# Patient Record
Sex: MALE | Race: WHITE | URBAN - METROPOLITAN AREA
[De-identification: names, ages, dates, MRNs, and addresses within clinical notes are randomized per-mention and may not be internally consistent; named-entity substitution may affect disease eponyms.]

---

## 2017-09-03 ENCOUNTER — OFFICE VISIT (OUTPATIENT)
Dept: URGENT CARE | Facility: URGENT CARE | Age: 27
End: 2017-09-03
Payer: COMMERCIAL

## 2017-09-03 ENCOUNTER — RADIANT APPOINTMENT (OUTPATIENT)
Dept: GENERAL RADIOLOGY | Facility: CLINIC | Age: 27
End: 2017-09-03
Attending: PHYSICIAN ASSISTANT
Payer: COMMERCIAL

## 2017-09-03 VITALS
WEIGHT: 175 LBS | OXYGEN SATURATION: 98 % | HEIGHT: 72 IN | SYSTOLIC BLOOD PRESSURE: 128 MMHG | BODY MASS INDEX: 23.7 KG/M2 | HEART RATE: 96 BPM | TEMPERATURE: 98.8 F | DIASTOLIC BLOOD PRESSURE: 60 MMHG

## 2017-09-03 DIAGNOSIS — M25.522 LEFT ELBOW PAIN: ICD-10-CM

## 2017-09-03 DIAGNOSIS — S52.125A CLOSED NONDISPLACED FRACTURE OF HEAD OF LEFT RADIUS, INITIAL ENCOUNTER: Primary | ICD-10-CM

## 2017-09-03 PROCEDURE — 29105 APPLICATION LONG ARM SPLINT: CPT | Performed by: PHYSICIAN ASSISTANT

## 2017-09-03 PROCEDURE — 99203 OFFICE O/P NEW LOW 30 MIN: CPT | Mod: 25 | Performed by: PHYSICIAN ASSISTANT

## 2017-09-03 PROCEDURE — 73080 X-RAY EXAM OF ELBOW: CPT | Mod: LT

## 2017-09-03 NOTE — LETTER
[facility] URGENT [facility]  [address]  [address]  [place] [address] [phone]          September 3, 2017    RE:  Que Bhatt                                                                                                                                                       [address]   [facility] [address]            To whom it may concern:    Que Bhatt was seen here today and diagnosed with a left elbow fracture. Please excuse him from work tomorrow and Tuesday.     Any additional work restrictions will be as per direction of orthopedic doctor.         Sincerely,        Osman Ha PA-C

## 2017-09-03 NOTE — MR AVS SNAPSHOT
After Visit Summary   9/3/2017    Que Bhatt    MRN: [id number]           Patient Information     Date Of Birth          1990        Visit Information        Provider Department      9/3/2017 7:50 PM Osman Hernandez PA-C [facility]        Today's Diagnoses     Left elbow pain    -  1      Care Instructions      Elbow Bruise  You have a bruise (contusion) of your elbow. A bruise causes local pain, swelling, and sometimes bruising. There are no broken bones. This injury takes a few days to a few weeks to heal. You may be given a sling for comfort and arm support.  You may notice color changes over the skin. It may change from reddish to bluish to greenish or yellowish before the bruising fades. The skin will then go back to its normal color.  Home care  Follow these guidelines when caring for yourself at home.    Keep your arm elevated to reduce pain and swelling. This is most important during the first 2 days (48 hours) after the injury.    Put an ice pack on the injured area. Do this for 20 minutes every 1 to 2 hours the first day. You can make an ice pack by wrapping a plastic bag of ice in a thin towel. You should continue to use the ice pack 3 to 4 times a day for the next 2 days. Then use the ice pack as needed to ease pain and swelling.    Don t use a heating pad.    Don t stick a needle into the contusion or bruising to drain it.    You may use acetaminophen or ibuprofen to control pain, unless another pain medicine was prescribed. If you have chronic liver or kidney disease, talk with your healthcare provider before using these medicines. Also talk with your provider if you ve had a stomach ulcer or gastrointestinal bleeding.    If a sling was provided, you may take it off to shower or bathe. Don t wear it for more than 1 week or it may cause joint stiffness.  Follow-up care  Follow up with your healthcare provider, or as advised, if you are not starting  to get better within the next 3 days.  When to seek medical advice  Call your healthcare provider right away if any of these occur:    Pain or swelling gets worse    The back of your elbow becomes very swollen where it almost looks like a gold ball or egg-like mass is growing there. This is a sign of olecrenon bursitis or septic bursitis which may need immediate treatment.    Redness, red streaks down the arm, warmth, or drainage from the bruise    Hand or fingers becomes cold, blue, numb, or tingly    New bruises, and you don t know what caused them    Contusion doesn t heal  Date Last Reviewed: 2/1/2017 2000-2017 [employer]. [address] [place] [address], [place] [id number]. All rights reserved. This information is not intended as a substitute for professional medical care. Always follow your healthcare professional's instructions.        Elbow Fracture    You have a break (fracture) of one or more bones of your elbow joint. This may be a small crack in the bone. Or it may be a major break, with the broken parts pushed out of position.  This fracture usually takes 4 to 12 weeks to heal, depending on the type. The first step in treatment is with a splint or cast. Severe fractures may need surgery to put the bone fragments back into place.  Home care  Follow these guidelines when caring for yourself at home:    Keep your arm elevated to reduce pain and swelling. When sitting or lying down keep your arm above the level of your heart. You can do this by placing your arm on a pillow that rests on your chest or on a pillow at your side. This is most important during the first 2 days (48 hours) after the injury.    Put an ice pack on the injured area. Do this for 20 minutes every 1 to 2 hours the first day. You can make an ice pack by wrapping a plastic bag of ice cubes in a thin towel. As the ice melts, be careful that the cast or splint doesn t get wet. You can place the ice pack inside the sling and directly  over the splint or cast. Continue to use the ice pack 3 to 4 times a day for the next 2 days. Then use the ice pack as needed to ease pain and swelling.    Keep the splint or cast completely dry at all times. Bathe with your splint or cast out of the water. Protect it with a large plastic bag, rubber-banded at the top end. If a fiberglass splint or cast gets wet, you can dry it with a hair dryer.    You may use acetaminophen or ibuprofen to control pain, unless another pain medicine was prescribed. If you have chronic liver or kidney disease, talk with your healthcare provider before using these medicines. Also talk with your provider if you ve had a stomach ulcer or gastrointestinal bleeding.    Don t put creams or objects under the cast if you have itching.  Follow-up care  Follow up with your healthcare provider in 1 week, or as advised. This is to make sure the bone is healing the way it should. If a splint was put on, it may be changed to a cast during your follow-up visit.  X-rays may be taken. You will be told of any new findings that may affect your care.  When to seek medical advice  Call your healthcare provider right away if any of these occur:    The cast or splint cracks    The plaster cast or splint becomes wet or soft    The fiberglass cast or splint stays wet for more than 24 hours    Tightness or pain under the cast or splint gets worse    Bad odor from the cast or wound fluid stains the cast    Fingers become swollen, cold, blue, numb, or tingly    You can t move your fingers    Skin around cast becomes red    Fever of 100.4 F (38 C) or higher, or as directed by your healthcare provider   Date Last Reviewed: 2/6/2017 2000-2017 [employer] [address] [place] [address], [place] [address]. All rights reserved. This information is not intended as a substitute for professional medical care. Always follow your healthcare professional's instructions.                Follow-ups after your  "visit        Additional Services     ORTHOPEDICS ADULT REFERRAL       Your provider has referred you to: [facility]: [facility]  [phone]   [email or web address]    Please be aware that coverage of these services is subject to the terms and limitations of your health insurance plan.  Call member services at your health plan with any benefit or coverage questions.      Please bring the following to your appointment:    >>   Any x-rays, CTs or MRIs which have been performed.  Contact the facility where they were done to arrange for  prior to your scheduled appointment.    >>   List of current medications   >>   This referral request   >>   Any documents/labs given to you for this referral                  Who to contact     If you have questions or need follow up information about today's clinic visit or your schedule please contact [facility] directly at [phone].  Normal or non-critical lab and imaging results will be communicated to you by MyChart, letter or phone within 4 business days after the clinic has received the results. If you do not hear from us within 7 days, please contact the clinic through [employer] or phone. If you have a critical or abnormal lab result, we will notify you by phone as soon as possible.  Submit refill requests through [employer] or call your pharmacy and they will forward the refill request to us. Please allow 3 business days for your refill to be completed.          Additional Information About Your Visit        [employer] / [email or web address] Information     [employer] lets you send messages to your doctor, view your test results, renew your prescriptions, schedule appointments and more. To sign up, go to [email or web address] . Click on \"Log in\" on the left side of the screen, which will take you to the Welcome page. Then click on \"Sign up Now\" on the right " side of the page.     You will be asked to enter the access code listed below, as well as some personal information. Please follow the directions to create your username and password.     Your access code is: [email or web address]  Expires: 2017  8:34 PM     Your access code will  in 90 days. If you need help or a new code, please call your [place] clinic or [phone].        Care EveryWhere ID     This is your Care EveryWhere ID. This could be used by other organizations to access your [place] medical records  [id number]        Your Vitals Were     Pulse Temperature Height Pulse Oximetry BMI (Body Mass Index)       96 98.8  F (37.1  C) (Oral) 6' (1.829 m) 98% 23.73 kg/m2        Blood Pressure from Last 3 Encounters:   17 128/60   05 118/70    Weight from Last 3 Encounters:   17 175 lb (79.4 kg)   05 168 lb (76.2 kg) (86 %)*     * Growth percentiles are based on [facility] 2-20 Years data.              We Performed the Following     ORTHOPEDICS ADULT REFERRAL        Primary Care Provider    None Specified       No primary provider on file.        Equal Access to Services     SANJUANA HINDS : Hadii quintin Meadows, wajessika cabezas, qaybta kaalmada seven, antoinette dye . So [facility] [phone].    ATENCIÓN: Si habla español, tiene a villalobos disposición servicios gratuitos de asistencia lingüística. Llame al [phone].    We comply with applicable federal civil rights laws and [place] laws. We do not discriminate on the basis of race, color, national origin, age, disability sex, sexual orientation or gender identity.            Thank you!     Thank you for choosing [facility]  for your care. Our goal is always to provide you with excellent care. Hearing back from our patients is one way we can continue to improve our services. Please take a few minutes to complete the written survey that you may receive in the mail after your visit with us.  Thank you!             Your Updated Medication List - Protect others around you: Learn how to safely use, store and throw away your medicines at www.disposemymeds.org.          This list is accurate as of: 9/3/17  8:34 PM.  Always use your most recent med list.                   Brand Name Dispense Instructions for use Diagnosis    ADDERALL PO

## 2017-09-04 NOTE — NURSING NOTE
Que Bhatt;   Chief Complaint   Patient presents with     Musculoskeletal Problem     fell while riding a long board this afternoon, c/o pain in left elbow area      Urgent Care     Initial /60 (BP Location: Right arm, Patient Position: Chair, Cuff Size: Adult Regular)  Pulse 96  Temp 98.8  F (37.1  C) (Oral)  Ht 6' (1.829 m)  Wt 175 lb (79.4 kg)  SpO2 98%  BMI 23.73 kg/m2 Estimated body mass index is 23.73 kg/(m^2) as calculated from the following:    Height as of this encounter: 6' (1.829 m).    Weight as of this encounter: 175 lb (79.4 kg)..  BP completed using cuff size regular.  Geena Bass R.N.

## 2017-09-06 ENCOUNTER — OFFICE VISIT (OUTPATIENT)
Dept: ORTHOPEDICS | Facility: CLINIC | Age: 27
End: 2017-09-06
Payer: COMMERCIAL

## 2017-09-06 VITALS — SYSTOLIC BLOOD PRESSURE: 125 MMHG | DIASTOLIC BLOOD PRESSURE: 86 MMHG | HEART RATE: 63 BPM

## 2017-09-06 DIAGNOSIS — S52.125A CLOSED NONDISPLACED FRACTURE OF HEAD OF LEFT RADIUS, INITIAL ENCOUNTER: Primary | ICD-10-CM

## 2017-09-06 PROCEDURE — 24650 CLTX RDL HEAD/NCK FX WO MNPJ: CPT | Mod: LT | Performed by: PREVENTIVE MEDICINE

## 2017-09-06 PROCEDURE — 99207 ZZC NO CHARGE LOS: CPT | Performed by: PREVENTIVE MEDICINE

## 2017-09-06 ASSESSMENT — PAIN SCALES - GENERAL: PAINLEVEL: NO PAIN (0)

## 2017-09-06 NOTE — PROGRESS NOTES
HISTORY OF PRESENT ILLNESS  Mr. Bhatt is a pleasant 27 year old year old male who presents to clinic today with left elbow pain after fall  Que explains that he fell off of his long board and injured his elbow and had xrays and placed in splint  Location: left elbow  Quality:  achy pain    Severity: 3/10 at worst    Duration: 4 days  Timing: occurs with movement  Context: occurs while exercising and lifting  Modifying factors:  resting and non-use makes it better, movement and use makes it worse  Associated signs & symptoms: some swelling in elbow  Previous similar pain: no  Additional history: as documented    MEDICAL HISTORY  There is no problem list on file for this patient.      Current Outpatient Prescriptions   Medication Sig Dispense Refill     Amphetamine-Dextroamphetamine (ADDERALL PO)          No Known Allergies    No family history on file.    Additional medical/Social/Surgical histories reviewed in [facility] and updated as appropriate.     REVIEW OF SYSTEMS (9/6/2017)  10 point ROS of systems including Constitutional, Eyes, Respiratory, Cardiovascular, Gastroenterology, Genitourinary, Integumentary, Musculoskeletal, Psychiatric were all negative except for pertinent positives noted in my HPI.     PHYSICAL EXAM  Vitals:    09/06/17 1335   BP: 125/86   Pulse: 63     Vital Signs: /86  Pulse 63 Patient declined being weighed. There is no height or weight on file to calculate BMI.    General  - normal appearance, in no obvious distress  CV  - normal radial pulse  Pulm  - normal respiratory pattern, non-labored  Musculoskeletal - left elbow  - inspection: normal joint alignment, no obvious deformity, no soft tissue swelling medially  - palpation: tender at the radial head at elbow  - ROM:  160 deg flexion   0 deg extension   90 deg pronation   90 deg supination  - strength: 5/5 wrist flexion with elbow flexed, 4/5 with elbow extended, NON painful resisted flexion of fingers with elbow flexed, worse with  extension, 5/5  strength  - special tests:  (-) varus  (-) valgus  (-) Tinel's  Neuro  - no sensory or motor deficit, grossly normal coordination, normal muscle tone  Skin  - no ecchymosis, erythema, warmth, or induration, no obvious rash  Psych  - interactive, appropriate, normal mood and affect    ASSESSMENT & PLAN  26 yo male with left elbow radial head fracture  xrays show non-displaced fx of radial head  F/u xrays in 1 week  F/u in 1 month  Improve ROM and HEP given      Francois Franks MD, CAQSM

## 2017-09-06 NOTE — NURSING NOTE
Que Bhatt's goals for this visit include: Evaluate left elbow.   He requests these members of his care team be copied on today's visit information: no    PCP: No primary care provider on file.    Referring Provider:  No referring provider defined for this encounter.    Chief Complaint   Patient presents with     Elbow Pain     Left elbow injury while longboarding last Sunday.       Initial /86  Pulse 63 Estimated body mass index is 23.73 kg/(m^2) as calculated from the following:    Height as of 9/3/17: 1.829 m (6').    Weight as of 9/3/17: 79.4 kg (175 lb).  Medication Reconciliation: complete

## 2017-09-06 NOTE — MR AVS SNAPSHOT
After Visit Summary   9/6/2017    Que Bhatt    MRN: [id number]           Patient Information     Date Of Birth          1990        Visit Information        Provider Department      9/6/2017 1:20 PM Francois Franks MD [facility]        Today's Diagnoses     Closed nondisplaced fracture of head of left radius, initial encounter    -  1       Follow-ups after your visit        Future tests that were ordered for you today     Open Future Orders        Priority Expected Expires Ordered    XR Elbow Left G/E 3 Views Routine 9/6/2017 9/6/2018 9/6/2017            Who to contact     If you have questions or need follow up information about today's clinic visit or your schedule please contact [facility] directly at [phone].  Normal or non-critical lab and imaging results will be communicated to you by MyChart, letter or phone within 4 business days after the clinic has received the results. If you do not hear from us within 7 days, please contact the clinic through MyChart or phone. If you have a critical or abnormal lab result, we will notify you by phone as soon as possible.  Submit refill requests through [employer] or call your pharmacy and they will forward the refill request to us. Please allow 3 business days for your refill to be completed.          Additional Information About Your Visit        [employer] Information     [employer] is an electronic gateway that provides easy, online access to your medical records. With [employer], you can request a clinic appointment, read your test results, renew a prescription or communicate with your care team.     To sign up for [employer] visit the website at [email or web address]   You will be asked to enter the access code listed below, as well as some personal information. Please follow the directions to create your username and password.     Your access code is: [email or web address]  Expires: 12/2/2017  8:34 PM     Your  access code will  in 90 days. If you need help or a new code, please contact your [facility] or call [phone] for assistance.        Care EveryWhere ID     This is your Care EveryWhere ID. This could be used by other organizations to access your [place] medical records  [id number]        Your Vitals Were     Pulse                   63            Blood Pressure from Last 3 Encounters:   17 125/86   17 128/60   05 118/70    Weight from Last 3 Encounters:   17 79.4 kg (175 lb)   05 76.2 kg (168 lb) (86 %)*     * Growth percentiles are based on [facility][note 2] 2-20 Years data.               Primary Care Provider    None Specified       No primary provider on file.        Equal Access to Services     LUIS HINDS : Malaika Meadows, waaxda luqadaha, qaybta kaalmada ahmetyakierra, antoinette molina. So [facility] [phone].    ATENCIÓN: Si habla español, tiene a villalobos disposición servicios gratuitos de asistencia lingüística. Llame al [phone].    We comply with applicable federal civil rights laws and [place] laws. We do not discriminate on the basis of race, color, national origin, age, disability sex, sexual orientation or gender identity.            Thank you!     Thank you for choosing [facility]  for your care. Our goal is always to provide you with excellent care. Hearing back from our patients is one way we can continue to improve our services. Please take a few minutes to complete the written survey that you may receive in the mail after your visit with us. Thank you!             Your Updated Medication List - Protect others around you: Learn how to safely use, store and throw away your medicines at www.disposemymeds.org.          This list is accurate as of: 17  1:53 PM.  Always use your most recent med list.                   Brand Name Dispense Instructions for use Diagnosis    ADDERALL PO

## 2017-09-09 NOTE — PROGRESS NOTES
SUBJECTIVE:  Chief Complaint   Patient presents with     Musculoskeletal Problem     fell while riding a long board this afternoon, c/o pain in left elbow area      Urgent Care     Que Bhatt is a 27 year old, right hand dominant,  male presents with a chief complaint of left elbow pain, swelling and decreased range of motion.  The injury occurred 6 hours(s) ago.   The injury happened while longboarding.   How: He isn't sure but thinks he partially broke fall with outstretched arm and may have twisted forearm in process of falling.   The patient complained of severe pain. Worst pain is 7-8/10 with supination and pronation and best pain at rest is 3/10. He has had decreased ROM.    Pain exacerbated by supination, pronation>flex and ext.    Relieved by improved but not relieved in position of rest (sling position).    He treated it initially with ice and Ibuprofen. This is the first time this type of injury has occurred to this patient.     Denies any cold, blue, numb or tingly fingers.  Denies any associated head or neck injury. No pain elsewhere.     No past medical history on file.  Current Outpatient Prescriptions   Medication Sig Dispense Refill     Amphetamine-Dextroamphetamine (ADDERALL PO)        Social History   Substance Use Topics     Smoking status: Never Smoker     Smokeless tobacco: Not on file     Alcohol use No         EXAM:   /60 (BP Location: Right arm, Patient Position: Chair, Cuff Size: Adult Regular)  Pulse 96  Temp 98.8  F (37.1  C) (Oral)  Ht 6' (1.829 m)  Wt 175 lb (79.4 kg)  SpO2 98%  BMI 23.73 kg/m2     Gen: healthy,alert,no distress  LUE: Positive for moderate swelling medial aspect of elbow. No obvious deformity. No obvious dislocation or mal-alignment. No bruising.  Remainder of upper extremity unremarkable on inspection. Patient has focal tenderness over radial head at elbow. Non-tender on palpation of shoulder, humerus, remainder of radius, ulna, wrist, hand or digits.  "Limited pronation, supination, flexion and extension today due to pain. I intentionally did not explore degree of ROM  limits today due to significant  pain with ROM today.     There is not compromise to the distal circulation.  Pulses are +2 and CRT is brisk  GENERAL APPEARANCE: healthy, alert and no distress  EXTREMITIES: peripheral pulses normal  SKIN: no suspicious lesions or rashes  NEURO: Normal strength and tone, sensory exam grossly normal, mentation intact and speech normal    I reviewed my impression (small radial head fracture), along with actual x-ray images, with patient during his office visit today.  Radiologist over-read pending. Patient will need to be called if there are any acute or discrepant  findings on radiologist over-read.     ASSESSMENT/PLAN:    (S52.456A) Closed nondisplaced fracture of head of left radius, initial encounter  (primary encounter diagnosis)  Plan: XR Elbow Left G/E 3 Views, ORTHOPEDICS ADULT         REFERRAL, APPLY LONG ARM SPLINT    1. I personally placed long arm posterior splint today.   2. Sling   3. RICE  4. Ibuprofen 600 mg tid prn   5. Patient offered few tabs of Vicodin for breakthrough pain but declined   6. Advised ortho follow-up in next 1-2 days ([facility] Referral provided)  7.  In addition to the above, elbow fracture \"red flag\" signs and sxs are reviewed with pt both verbally and by way of printed educational material for home review.  Pt verbalizes understanding of and agrees to the above plan.       (M25.391) Left elbow pain  Plan: XR Elbow Left G/E 3 Views, ORTHOPEDICS ADULT         REFERRAL  As per above         "

## 2023-05-10 NOTE — PATIENT INSTRUCTIONS
Elbow Bruise  You have a bruise (contusion) of your elbow. A bruise causes local pain, swelling, and sometimes bruising. There are no broken bones. This injury takes a few days to a few weeks to heal. You may be given a sling for comfort and arm support.  You may notice color changes over the skin. It may change from reddish to bluish to greenish or yellowish before the bruising fades. The skin will then go back to its normal color.  Home care  Follow these guidelines when caring for yourself at home.    Keep your arm elevated to reduce pain and swelling. This is most important during the first 2 days (48 hours) after the injury.    Put an ice pack on the injured area. Do this for 20 minutes every 1 to 2 hours the first day. You can make an ice pack by wrapping a plastic bag of ice in a thin towel. You should continue to use the ice pack 3 to 4 times a day for the next 2 days. Then use the ice pack as needed to ease pain and swelling.    Don t use a heating pad.    Don t stick a needle into the contusion or bruising to drain it.    You may use acetaminophen or ibuprofen to control pain, unless another pain medicine was prescribed. If you have chronic liver or kidney disease, talk with your healthcare provider before using these medicines. Also talk with your provider if you ve had a stomach ulcer or gastrointestinal bleeding.    If a sling was provided, you may take it off to shower or bathe. Don t wear it for more than 1 week or it may cause joint stiffness.  Follow-up care  Follow up with your healthcare provider, or as advised, if you are not starting to get better within the next 3 days.  When to seek medical advice  Call your healthcare provider right away if any of these occur:    Pain or swelling gets worse    The back of your elbow becomes very swollen where it almost looks like a gold ball or egg-like mass is growing there. This is a sign of olecrenon bursitis or septic bursitis which may need immediate  treatment.    Redness, red streaks down the arm, warmth, or drainage from the bruise    Hand or fingers becomes cold, blue, numb, or tingly    New bruises, and you don t know what caused them    Contusion doesn t heal  Date Last Reviewed: 2/1/2017 2000-2017 [employer]. [address] [place] [address], [place] [address]. All rights reserved. This information is not intended as a substitute for professional medical care. Always follow your healthcare professional's instructions.        Elbow Fracture    You have a break (fracture) of one or more bones of your elbow joint. This may be a small crack in the bone. Or it may be a major break, with the broken parts pushed out of position.  This fracture usually takes 4 to 12 weeks to heal, depending on the type. The first step in treatment is with a splint or cast. Severe fractures may need surgery to put the bone fragments back into place.  Home care  Follow these guidelines when caring for yourself at home:    Keep your arm elevated to reduce pain and swelling. When sitting or lying down keep your arm above the level of your heart. You can do this by placing your arm on a pillow that rests on your chest or on a pillow at your side. This is most important during the first 2 days (48 hours) after the injury.    Put an ice pack on the injured area. Do this for 20 minutes every 1 to 2 hours the first day. You can make an ice pack by wrapping a plastic bag of ice cubes in a thin towel. As the ice melts, be careful that the cast or splint doesn t get wet. You can place the ice pack inside the sling and directly over the splint or cast. Continue to use the ice pack 3 to 4 times a day for the next 2 days. Then use the ice pack as needed to ease pain and swelling.    Keep the splint or cast completely dry at all times. Bathe with your splint or cast out of the water. Protect it with a large plastic bag, rubber-banded at the top end. If a fiberglass splint or cast gets  wet, you can dry it with a hair dryer.    You may use acetaminophen or ibuprofen to control pain, unless another pain medicine was prescribed. If you have chronic liver or kidney disease, talk with your healthcare provider before using these medicines. Also talk with your provider if you ve had a stomach ulcer or gastrointestinal bleeding.    Don t put creams or objects under the cast if you have itching.  Follow-up care  Follow up with your healthcare provider in 1 week, or as advised. This is to make sure the bone is healing the way it should. If a splint was put on, it may be changed to a cast during your follow-up visit.  X-rays may be taken. You will be told of any new findings that may affect your care.  When to seek medical advice  Call your healthcare provider right away if any of these occur:    The cast or splint cracks    The plaster cast or splint becomes wet or soft    The fiberglass cast or splint stays wet for more than 24 hours    Tightness or pain under the cast or splint gets worse    Bad odor from the cast or wound fluid stains the cast    Fingers become swollen, cold, blue, numb, or tingly    You can t move your fingers    Skin around cast becomes red    Fever of 100.4 F (38 C) or higher, or as directed by your healthcare provider   Date Last Reviewed: 2/6/2017 2000-2017 [employer]. [address] [place] [address], [place] [address]. All rights reserved. This information is not intended as a substitute for professional medical care. Always follow your healthcare professional's instructions.         verbal instruction